# Patient Record
Sex: MALE | Race: WHITE | Employment: FULL TIME | URBAN - NONMETROPOLITAN AREA
[De-identification: names, ages, dates, MRNs, and addresses within clinical notes are randomized per-mention and may not be internally consistent; named-entity substitution may affect disease eponyms.]

---

## 2017-06-02 ENCOUNTER — OFFICE VISIT (OUTPATIENT)
Dept: OTOLARYNGOLOGY | Age: 47
End: 2017-06-02

## 2017-06-02 VITALS
HEART RATE: 68 BPM | HEIGHT: 70 IN | WEIGHT: 156.5 LBS | TEMPERATURE: 97.7 F | DIASTOLIC BLOOD PRESSURE: 70 MMHG | BODY MASS INDEX: 22.41 KG/M2 | RESPIRATION RATE: 16 BRPM | SYSTOLIC BLOOD PRESSURE: 118 MMHG

## 2017-06-02 DIAGNOSIS — J36 PERITONSILLAR ABSCESS: Primary | ICD-10-CM

## 2017-06-02 PROCEDURE — 99203 OFFICE O/P NEW LOW 30 MIN: CPT | Performed by: PHYSICIAN ASSISTANT

## 2017-06-02 ASSESSMENT — ENCOUNTER SYMPTOMS
SORE THROAT: 0
APNEA: 0
VOMITING: 0
EYE PAIN: 0
RHINORRHEA: 0
EYE ITCHING: 0
PHOTOPHOBIA: 0
CONSTIPATION: 0
ANAL BLEEDING: 0
NAUSEA: 0
SINUS PRESSURE: 0
COUGH: 0
BACK PAIN: 0
EYE REDNESS: 0
STRIDOR: 0
CHOKING: 0
FACIAL SWELLING: 1
BLOOD IN STOOL: 0
ABDOMINAL PAIN: 0
DIARRHEA: 0
TROUBLE SWALLOWING: 1
EYE DISCHARGE: 0
ABDOMINAL DISTENTION: 0
WHEEZING: 0
SHORTNESS OF BREATH: 0
COLOR CHANGE: 0
RECTAL PAIN: 0
CHEST TIGHTNESS: 0
VOICE CHANGE: 0

## 2017-06-19 ENCOUNTER — TELEPHONE (OUTPATIENT)
Dept: OTOLARYNGOLOGY | Age: 47
End: 2017-06-19

## 2017-06-20 ENCOUNTER — OFFICE VISIT (OUTPATIENT)
Dept: OTOLARYNGOLOGY | Age: 47
End: 2017-06-20

## 2017-06-20 VITALS
TEMPERATURE: 98.2 F | RESPIRATION RATE: 12 BRPM | WEIGHT: 160.1 LBS | HEART RATE: 54 BPM | BODY MASS INDEX: 22.92 KG/M2 | HEIGHT: 70 IN | DIASTOLIC BLOOD PRESSURE: 70 MMHG | SYSTOLIC BLOOD PRESSURE: 98 MMHG

## 2017-06-20 DIAGNOSIS — J36 PERITONSILLAR ABSCESS: Primary | ICD-10-CM

## 2017-06-20 PROCEDURE — 99213 OFFICE O/P EST LOW 20 MIN: CPT | Performed by: PHYSICIAN ASSISTANT

## 2017-06-20 ASSESSMENT — ENCOUNTER SYMPTOMS
ABDOMINAL DISTENTION: 0
CHOKING: 0
VOMITING: 0
WHEEZING: 0
EYE ITCHING: 0
BACK PAIN: 0
SINUS PRESSURE: 0
FACIAL SWELLING: 0
RECTAL PAIN: 0
STRIDOR: 0
EYE DISCHARGE: 0
RHINORRHEA: 0
ABDOMINAL PAIN: 0
PHOTOPHOBIA: 0
VOICE CHANGE: 0
SHORTNESS OF BREATH: 0
BLOOD IN STOOL: 0
COUGH: 1
CHEST TIGHTNESS: 0
DIARRHEA: 0
SORE THROAT: 1
EYE REDNESS: 0
NAUSEA: 0
CONSTIPATION: 0
ANAL BLEEDING: 0
COLOR CHANGE: 0
EYE PAIN: 0
APNEA: 0
TROUBLE SWALLOWING: 0

## 2018-04-05 ENCOUNTER — HOSPITAL ENCOUNTER (EMERGENCY)
Age: 48
Discharge: HOME | End: 2018-04-05
Payer: COMMERCIAL

## 2018-04-05 VITALS
DIASTOLIC BLOOD PRESSURE: 79 MMHG | HEART RATE: 72 BPM | BODY MASS INDEX: 23.39 KG/M2 | TEMPERATURE: 99.1 F | HEIGHT: 70 IN | OXYGEN SATURATION: 97 % | SYSTOLIC BLOOD PRESSURE: 122 MMHG | RESPIRATION RATE: 18 BRPM | WEIGHT: 163.4 LBS

## 2018-04-05 DIAGNOSIS — J02.9 ACUTE PHARYNGITIS, UNSPECIFIED ETIOLOGY: Primary | ICD-10-CM

## 2018-04-05 PROCEDURE — 99212 OFFICE O/P EST SF 10 MIN: CPT

## 2018-04-05 RX ORDER — CEPHALEXIN 500 MG/1
500 CAPSULE ORAL 2 TIMES DAILY
Qty: 20 CAPSULE | Refills: 0 | Status: SHIPPED | OUTPATIENT
Start: 2018-04-05 | End: 2018-04-15

## 2018-04-05 ASSESSMENT — PAIN SCALES - GENERAL: PAINLEVEL_OUTOF10: 4

## 2018-04-05 ASSESSMENT — ENCOUNTER SYMPTOMS
SORE THROAT: 1
TROUBLE SWALLOWING: 1
COUGH: 1

## 2018-04-05 ASSESSMENT — PAIN DESCRIPTION - LOCATION: LOCATION: GENERALIZED

## 2018-04-05 ASSESSMENT — PAIN DESCRIPTION - PAIN TYPE: TYPE: ACUTE PAIN

## 2018-04-05 ASSESSMENT — PAIN DESCRIPTION - DESCRIPTORS: DESCRIPTORS: ACHING

## 2019-02-04 ENCOUNTER — HOSPITAL ENCOUNTER (EMERGENCY)
Age: 49
Discharge: HOME | End: 2019-02-04
Payer: COMMERCIAL

## 2019-02-04 VITALS
OXYGEN SATURATION: 98 % | RESPIRATION RATE: 16 BRPM | BODY MASS INDEX: 22.62 KG/M2 | HEART RATE: 64 BPM | WEIGHT: 158 LBS | TEMPERATURE: 98.3 F | HEIGHT: 70 IN | DIASTOLIC BLOOD PRESSURE: 73 MMHG | SYSTOLIC BLOOD PRESSURE: 134 MMHG

## 2019-02-04 DIAGNOSIS — M79.674 GREAT TOE PAIN, RIGHT: Primary | ICD-10-CM

## 2019-02-04 PROCEDURE — 99213 OFFICE O/P EST LOW 20 MIN: CPT | Performed by: NURSE PRACTITIONER

## 2019-02-04 PROCEDURE — 99212 OFFICE O/P EST SF 10 MIN: CPT

## 2019-02-04 RX ORDER — PREDNISONE 20 MG/1
TABLET ORAL
Qty: 21 TABLET | Refills: 0 | Status: SHIPPED | OUTPATIENT
Start: 2019-02-04 | End: 2019-12-28

## 2019-02-04 RX ORDER — MELOXICAM 15 MG/1
15 TABLET ORAL DAILY
COMMUNITY
End: 2019-12-28 | Stop reason: ALTCHOICE

## 2019-02-04 ASSESSMENT — PAIN DESCRIPTION - DESCRIPTORS: DESCRIPTORS: SORE;SHOOTING

## 2019-02-04 ASSESSMENT — PAIN - FUNCTIONAL ASSESSMENT: PAIN_FUNCTIONAL_ASSESSMENT: PREVENTS OR INTERFERES SOME ACTIVE ACTIVITIES AND ADLS

## 2019-02-04 ASSESSMENT — PAIN DESCRIPTION - ONSET: ONSET: GRADUAL

## 2019-02-04 ASSESSMENT — PAIN DESCRIPTION - PAIN TYPE: TYPE: ACUTE PAIN

## 2019-02-04 ASSESSMENT — PAIN DESCRIPTION - LOCATION: LOCATION: FOOT

## 2019-02-04 ASSESSMENT — PAIN DESCRIPTION - PROGRESSION: CLINICAL_PROGRESSION: NOT CHANGED

## 2019-02-04 ASSESSMENT — ENCOUNTER SYMPTOMS
SHORTNESS OF BREATH: 0
COLOR CHANGE: 0
CHEST TIGHTNESS: 0

## 2019-02-04 ASSESSMENT — PAIN SCALES - GENERAL: PAINLEVEL_OUTOF10: 6

## 2019-02-04 ASSESSMENT — PAIN DESCRIPTION - ORIENTATION: ORIENTATION: RIGHT

## 2019-02-04 ASSESSMENT — PAIN DESCRIPTION - FREQUENCY: FREQUENCY: CONTINUOUS

## 2019-12-28 ENCOUNTER — HOSPITAL ENCOUNTER (EMERGENCY)
Age: 49
Discharge: HOME | End: 2019-12-28
Attending: EMERGENCY MEDICINE
Payer: COMMERCIAL

## 2019-12-28 VITALS
DIASTOLIC BLOOD PRESSURE: 83 MMHG | BODY MASS INDEX: 22.96 KG/M2 | SYSTOLIC BLOOD PRESSURE: 122 MMHG | OXYGEN SATURATION: 98 % | TEMPERATURE: 98.1 F | WEIGHT: 160 LBS | HEART RATE: 70 BPM | RESPIRATION RATE: 16 BRPM

## 2019-12-28 PROCEDURE — 99213 OFFICE O/P EST LOW 20 MIN: CPT

## 2019-12-28 PROCEDURE — 99213 OFFICE O/P EST LOW 20 MIN: CPT | Performed by: EMERGENCY MEDICINE

## 2019-12-28 RX ORDER — DOXYCYCLINE HYCLATE 100 MG
100 TABLET ORAL 2 TIMES DAILY
Qty: 14 TABLET | Refills: 0 | Status: SHIPPED | OUTPATIENT
Start: 2019-12-28 | End: 2020-01-04

## 2019-12-28 RX ORDER — IBUPROFEN 600 MG/1
600 TABLET ORAL EVERY 6 HOURS PRN
Qty: 20 TABLET | Refills: 0 | Status: SHIPPED | OUTPATIENT
Start: 2019-12-28 | End: 2020-01-17

## 2019-12-28 RX ORDER — PROMETHAZINE HYDROCHLORIDE AND CODEINE PHOSPHATE 6.25; 1 MG/5ML; MG/5ML
5 SYRUP ORAL 4 TIMES DAILY PRN
Qty: 120 ML | Refills: 0 | Status: SHIPPED | OUTPATIENT
Start: 2019-12-28 | End: 2020-01-01

## 2019-12-28 RX ORDER — IBUPROFEN 400 MG/1
400 TABLET ORAL EVERY 6 HOURS PRN
COMMUNITY
End: 2019-12-28 | Stop reason: ALTCHOICE

## 2019-12-28 ASSESSMENT — ENCOUNTER SYMPTOMS
RHINORRHEA: 1
EYE REDNESS: 0
WHEEZING: 0
NAUSEA: 0
EYE DISCHARGE: 0
DIARRHEA: 0
SINUS PRESSURE: 0
BACK PAIN: 0
ABDOMINAL PAIN: 0
TROUBLE SWALLOWING: 0
STRIDOR: 0
SHORTNESS OF BREATH: 0
EYE PAIN: 0
VOICE CHANGE: 0
SORE THROAT: 0
VOMITING: 0
COUGH: 1

## 2019-12-28 NOTE — ED PROVIDER NOTES
[id number]       Chief Complaint   Patient presents with    Cough     congested       Nurses Notes reviewed and I agree except as noted in the HPI. HISTORY OF PRESENT ILLNESS   Marjo Cranker is a 51 y.o. male who presents with 4-day history of cough, congestion, scratchy throat, fatigue, malaise, myalgias. Taking OTC meds with partial relief. Symptoms identical to previous bronchitis. No fever, vomiting, chest pain, shortness of breath, altered mental status, lethargy, rash,  symptoms. No history of asthma or diabetes. Smokes cigarettes. REVIEW OF SYSTEMS     Review of Systems   Constitutional: Positive for appetite change and fatigue. Negative for chills, fever and unexpected weight change. HENT: Positive for congestion, postnasal drip and rhinorrhea. Negative for ear discharge, ear pain, sinus pressure, sneezing, sore throat, trouble swallowing and voice change. Eyes: Negative for pain, discharge and redness. Respiratory: Positive for cough. Negative for shortness of breath, wheezing and stridor. Cardiovascular: Negative for chest pain and leg swelling. Gastrointestinal: Negative for abdominal pain, diarrhea, nausea and vomiting. Genitourinary: Negative for dysuria, frequency, hematuria and urgency. Musculoskeletal: Positive for myalgias. Negative for arthralgias, back pain and neck pain. Skin: Negative for rash. Neurological: Negative for dizziness, syncope, weakness and headaches. Hematological: Negative for adenopathy. Psychiatric/Behavioral: Negative for behavioral problems, confusion, sleep disturbance and suicidal ideas. The patient is not nervous/anxious. All other systems reviewed and are negative. PAST MEDICAL HISTORY         Diagnosis Date    Former smoker     Tonsil, abscess        SURGICAL HISTORY     Patient  has no past surgical history on file.     CURRENT MEDICATIONS       Discharge Medication List as of 12/28/2019  6:06 PM          ALLERGIES     Patient is has No Known Allergies. FAMILY HISTORY     Patient'sfamily history includes Alzheimer's Disease in his father and paternal grandfather; Cancer in his father; Heart Disease in his father and mother. SOCIAL HISTORY     Patient  reports that he has been smoking cigarettes. He has a 7.50 pack-year smoking history. He has never used smokeless tobacco. He reports current alcohol use of about 14.0 standard drinks of alcohol per week. He reports that he does not use drugs. PHYSICAL EXAM     ED TRIAGE VITALS  BP: 122/83, Temp: 98.1 °F (36.7 °C), Pulse: 70, Resp: 16, SpO2: 98 %  Physical Exam  Vitals signs and nursing note reviewed. Constitutional:       Appearance: He is well-developed. Comments: Dry cough, moist membranes, normal airway   HENT:      Head: Normocephalic and atraumatic. Right Ear: Tympanic membrane and external ear normal.      Left Ear: Tympanic membrane and external ear normal.      Nose: Nose normal.      Mouth/Throat:      Pharynx: No oropharyngeal exudate. Comments: Oropharynx normal  Eyes:      General: No scleral icterus. Right eye: No discharge. Left eye: No discharge. Extraocular Movements:      Right eye: Normal extraocular motion. Left eye: Normal extraocular motion. Conjunctiva/sclera: Conjunctivae normal.      Pupils: Pupils are equal, round, and reactive to light. Comments: Conjunctiva clear   Neck:      Musculoskeletal: Normal range of motion. Thyroid: No thyromegaly. Vascular: No JVD. Comments: No meningismus  Cardiovascular:      Rate and Rhythm: Normal rate and regular rhythm. Pulses: Normal pulses. Heart sounds: Normal heart sounds, S1 normal and S2 normal. No murmur. No friction rub. No gallop. Pulmonary:      Effort: Pulmonary effort is normal. No tachypnea or respiratory distress. Breath sounds: Normal breath sounds.  No stridor. No decreased breath sounds, wheezing or rales. Comments: Dry cough, diffuse rhonchi, breath sounds equal, no wheezing  Chest:      Chest wall: No tenderness. Abdominal:      General: Bowel sounds are normal. There is no distension. Palpations: Abdomen is soft. There is no mass. Tenderness: There is no tenderness. There is no guarding or rebound. Comments: Soft nontender   Musculoskeletal: Normal range of motion. General: No tenderness. Right lower leg: Normal.      Left lower leg: Normal.   Lymphadenopathy:      Cervical: Cervical adenopathy present. Right cervical: Superficial cervical adenopathy present. No deep or posterior cervical adenopathy. Left cervical: Superficial cervical adenopathy present. No deep or posterior cervical adenopathy. Skin:     General: Skin is warm and dry. Findings: No erythema or rash. Comments: No Rash or bruising   Neurological:      Mental Status: He is alert and oriented to person, place, and time. Cranial Nerves: No cranial nerve deficit. Motor: No abnormal muscle tone. Coordination: Coordination normal.      Deep Tendon Reflexes: Reflexes are normal and symmetric. Reflexes normal.      Comments: Appropriate, no focal findings   Psychiatric:         Behavior: Behavior normal.         Thought Content: Thought content normal.         Judgment: Judgment normal.         DIAGNOSTIC RESULTS   Labs: No results found for this visit on 12/28/19. IMAGING:  No orders to display     URGENT CARE COURSE:     Vitals:    12/28/19 1720   BP: 122/83   Pulse: 70   Resp: 16   Temp: 98.1 °F (36.7 °C)   TempSrc: Temporal   SpO2: 98%   Weight: 160 lb (72.6 kg)       Medications - No data to display  PROCEDURES:  None  FINALIMPRESSION      1. Acute bronchitis due to infection    2. Tobacco use disorder        DISPOSITION/PLAN   DISPOSITION Decision To Discharge 12/28/2019 06:02:03 PM  Nontoxic, well-hydrated, normal airway. No airway abscess or epiglottitis, sepsis, CNS infection, pneumonia, hypoxia, bronchospasm. No ACS, CHF, PE. Patient has acute bronchitis. Will treat with doxycycline, Phenergan with codeine, Motrin, Tylenol, increased oral clear liquids, vaporizer, rest.  Patient to recheck with PCP in 5 days if problems persist, and he understands to go to ED if worse. PATIENT REFERRED TO:  Nia Barragan DO  [address] [place] [address] Road  [phone]    Schedule an appointment as soon as possible for a visit   Recheck if problems persist, go to emergency if worse    DISCHARGE MEDICATIONS:  Discharge Medication List as of 12/28/2019  6:06 PM      START taking these medications    Details   doxycycline hyclate (VIBRA-TABS) 100 MG tablet Take 1 tablet by mouth 2 times daily for 7 days, Disp-14 tablet, R-0Print      promethazine-codeine (PHENERGAN WITH CODEINE) 6.25-10 MG/5ML syrup Take 5 mLs by mouth 4 times daily as needed for Cough for up to 4 days.  Caution will cause drowsiness, Disp-120 mL, R-0Print      ibuprofen (IBU) 600 MG tablet Take 1 tablet by mouth every 6 hours as needed for Pain or Fever (Take with food 3 times daily for pain or fever), Disp-20 tablet, R-0Print           Discharge Medication List as of 12/28/2019  6:06 PM          MD Vandana Ogden MD  12/28/19 0710

## 2023-09-20 ENCOUNTER — HOSPITAL ENCOUNTER (OUTPATIENT)
Dept: PHYSICAL THERAPY | Age: 53
Setting detail: THERAPIES SERIES
Discharge: HOME | End: 2023-09-20
Payer: COMMERCIAL

## 2023-09-20 PROCEDURE — 97162 PT EVAL MOD COMPLEX 30 MIN: CPT

## 2023-09-20 NOTE — DISCHARGE SUMMARY
** PLEASE SIGN, DATE AND TIME CERTIFICATION BELOW AND RETURN TO [facility] OUTPATIENT REHABILITATION (FAX #: [phone]). ATTEST/CO-SIGN IF ACCESSING VIA [employer]. THANK YOU.**    I certify that I have examined the patient below and determined that Physical Medicine and Rehabilitation service is necessary and that I approve the established plan of care for up to 90 days or as specifically noted. Attestation, signature or co-signature of physician indicates approval of certification requirements.    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 History of Present Illness: Polymyalgia rheumatica diagnosed 7 months ago-- still taking steroid  5mg per day . History of low back pain and early nerve burns.   Most recent [address] with good pain releif  AM is the worse, sitting is real uncomfortable      SUBJECTIVE: patient

## 2024-12-19 ENCOUNTER — OFFICE VISIT (OUTPATIENT)
Age: 54
End: 2024-12-19
Payer: COMMERCIAL

## 2024-12-19 VITALS
DIASTOLIC BLOOD PRESSURE: 72 MMHG | OXYGEN SATURATION: 98 % | SYSTOLIC BLOOD PRESSURE: 114 MMHG | WEIGHT: 168.6 LBS | BODY MASS INDEX: 24.14 KG/M2 | HEIGHT: 70 IN | HEART RATE: 78 BPM

## 2024-12-19 DIAGNOSIS — M25.531 PAIN AND SWELLING OF RIGHT WRIST: ICD-10-CM

## 2024-12-19 DIAGNOSIS — M35.3 PMR (POLYMYALGIA RHEUMATICA) (HCC): Primary | ICD-10-CM

## 2024-12-19 DIAGNOSIS — M25.431 PAIN AND SWELLING OF RIGHT WRIST: ICD-10-CM

## 2024-12-19 LAB
A/G RATIO: 1.4 (ref 1.5–2.5)
ALBUMIN: 4.2 G/DL (ref 3.5–5)
ALP BLD-CCNC: 76 IU/L (ref 41–137)
ALT SERPL-CCNC: 18 IU/L (ref 10–40)
ANION GAP SERPL CALCULATED.3IONS-SCNC: 6 MMOL/L (ref 4–12)
AST SERPL-CCNC: 27 IU/L (ref 15–41)
BASOPHILS ABSOLUTE: 100 /CMM (ref 0–200)
BASOPHILS RELATIVE PERCENT: 1.3 % (ref 0–2)
BILIRUB SERPL-MCNC: 0.6 MG/DL (ref 0.2–1)
BUN BLDV-MCNC: 10 MG/DL (ref 7–20)
C-REACTIVE PROTEIN: < 0.5 MG/DL
CALCIUM SERPL-MCNC: 9 MG/DL (ref 8.8–10.5)
CHLORIDE BLD-SCNC: 102 MEQ/L (ref 101–111)
CO2: 29 MEQ/L (ref 21–32)
CREAT SERPL-MCNC: 0.97 MG/DL (ref 0.6–1.3)
CREATININE CLEARANCE: >60
EOSINOPHILS ABSOLUTE: 0 /CMM (ref 0–500)
EOSINOPHILS RELATIVE PERCENT: 0.4 % (ref 0–6)
GLUCOSE: 77 MG/DL (ref 70–110)
HCT VFR BLD CALC: 48.1 % (ref 40–49)
HEMOGLOBIN: 16.7 GM/DL (ref 13.5–16.5)
LYMPHOCYTES ABSOLUTE: 2300 /CMM (ref 1000–4800)
LYMPHOCYTES RELATIVE PERCENT: 25.1 % (ref 15–45)
MCH RBC QN AUTO: 33.8 PG (ref 27.5–33)
MCHC RBC AUTO-ENTMCNC: 34.8 GM/DL (ref 33–36)
MCV RBC AUTO: 97.3 CU MIC (ref 80–97)
MONOCYTES ABSOLUTE: 700 /CMM (ref 0–800)
MONOCYTES RELATIVE PERCENT: 7.7 % (ref 2–10)
NEUTROPHILS ABSOLUTE: 6000 /CMM (ref 1800–7700)
NEUTROPHILS RELATIVE PERCENT: 65.5 % (ref 40–70)
NUCLEATED RBCS: 0.1 /100 WBC
PDW BLD-RTO: 12.8 % (ref 12–16)
PLATELET # BLD: 292 TH/CMM (ref 150–400)
POTASSIUM SERPL-SCNC: 4.2 MEQ/L (ref 3.6–5)
RBC # BLD: 4.95 MIL/CMM (ref 4.5–6)
SED RATE, AUTOMATED: 5 MM/HR
SODIUM BLD-SCNC: 137 MEQ/L (ref 135–145)
TOTAL CK: 113 IU/L (ref 49–397)
TOTAL PROTEIN: 7.2 G/DL (ref 6.2–8)
URIC ACID: 5.7 MG/DL (ref 4.8–8.7)
WBC # BLD: 9.1 TH/CMM (ref 4.4–10.5)

## 2024-12-19 PROCEDURE — 99204 OFFICE O/P NEW MOD 45 MIN: CPT | Performed by: INTERNAL MEDICINE

## 2024-12-19 RX ORDER — PREDNISONE 5 MG/1
TABLET ORAL
COMMUNITY
Start: 2024-09-15

## 2024-12-19 ASSESSMENT — ENCOUNTER SYMPTOMS
VOMITING: 0
WHEEZING: 1
COUGH: 0
SHORTNESS OF BREATH: 0
ABDOMINAL PAIN: 0
BLOOD IN STOOL: 0
NAUSEA: 0

## 2024-12-19 NOTE — PROGRESS NOTES
[facility]   Rheumatology Clinic Note      12/19/2024        Reason for Consult:  PMR  Requesting Physician:  Pablo Barragan DO     CHIEF COMPLAINT:    Chief Complaint   Patient presents with    New Patient     Joint pain and inflammation    Joint Pain     Pt has had low back pain quite sometime. He has noticed pain in upper shoulders; trouble with ROM. Pt Rx prednisone which has helped. Now experiencing bilateral wrist and finger joint pain.  Pain level 1 - worsened with sitting long periods and first thing in the mornings. Rt worsened area           HISTORY OF PRESENT ILLNESS:    54 y.o. male presents today for evaluation of PMR.     Was diagnosed with PMR January 2023. Has been on slow decrease prednisone. Is presently on 5 mg daily.    Has low back pain for 25 plus years.  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    Episodes of upper back and bilateral shoulder blade pain associated with muscle spasms. Severe pain that he holds his breath for few seconds.  Episodes of muscle spasm in his mid-abdomen.    2 years ago, he noted more pain in upper back area. Nerve ablation did not help.   Progressively worsen. Pain started involving his shoulders and hips. Was having difficulty raising his arms due to pain and stiffness.     Recently, he has been having pain in his wrists. Driving causes numbness in the right hand. Episodes of swelling in the right wrist. Wearing a wrist brace helps.     No skin rash.  No psoriasis.  No IBD-like symptoms.      Past Medical History:     has a past medical history of Former smoker and Tonsil, abscess.    Past Surgical History:     has no past surgical history on file.    Current Medications:      Current Outpatient Medications:     predniSONE (DELTASONE) 5 MG tablet, TAKE 2 TABLETS EVERY OTHER DAY ALTERNATING WITH 1 TABLET EVERY OTHER DAY, Disp: , Rfl:     Allergies:    Patient has no known

## 2024-12-20 ENCOUNTER — TELEPHONE (OUTPATIENT)
Dept: ADMINISTRATIVE | Age: 54
End: 2024-12-20

## 2024-12-20 ENCOUNTER — HOSPITAL ENCOUNTER (OUTPATIENT)
Dept: GENERAL RADIOLOGY | Age: 54
Discharge: HOME | End: 2024-12-20

## 2024-12-20 DIAGNOSIS — Z00.6 EXAMINATION FOR NORMAL COMPARISON FOR CLINICAL RESEARCH: ICD-10-CM

## 2024-12-20 LAB
CYCLIC CITRULLINATED PEPTIDE ANTIBODY IGG: < 0.4 U/ML (ref 0–6.9)
RHEUMATOID FACTOR: NEGATIVE

## 2024-12-20 NOTE — TELEPHONE ENCOUNTER
Pt called and states Dr. Kate is sending over his labs results from about a year ago and he wants this office to review them.  Please advise pt at [phone]

## 2024-12-23 ENCOUNTER — HOSPITAL ENCOUNTER (OUTPATIENT)
Dept: GENERAL RADIOLOGY | Age: 54
Discharge: HOME | End: 2024-12-23

## 2024-12-23 DIAGNOSIS — Z00.6 EXAMINATION FOR NORMAL COMPARISON FOR CLINICAL RESEARCH: ICD-10-CM

## 2024-12-30 ENCOUNTER — TELEPHONE (OUTPATIENT)
Age: 54
End: 2024-12-30

## 2024-12-30 RX ORDER — PREDNISONE 1 MG/1
5 TABLET ORAL DAILY
Qty: 150 TABLET | Refills: 1 | Status: SHIPPED | OUTPATIENT
Start: 2024-12-30 | End: 2025-02-28

## 2024-12-30 NOTE — TELEPHONE ENCOUNTER
Please inform pt that his blood tests and XR all looked good. Markers of inflammation were in normal range. Testing for gout and rheumatoid arthritis were negative.    I'd like to decrease his prednsione from 5 mg daily to 4 mg daily.  I'm sending prednsione 1 mg tab, take 4 tab once daily to his pharmacy.

## 2025-01-20 ENCOUNTER — OFFICE VISIT (OUTPATIENT)
Age: 55
End: 2025-01-20
Payer: COMMERCIAL

## 2025-01-20 VITALS
WEIGHT: 168.65 LBS | HEIGHT: 70 IN | DIASTOLIC BLOOD PRESSURE: 78 MMHG | HEART RATE: 67 BPM | BODY MASS INDEX: 24.14 KG/M2 | OXYGEN SATURATION: 97 % | SYSTOLIC BLOOD PRESSURE: 120 MMHG

## 2025-01-20 DIAGNOSIS — M25.531 PAIN AND SWELLING OF RIGHT WRIST: Primary | ICD-10-CM

## 2025-01-20 DIAGNOSIS — M25.431 PAIN AND SWELLING OF RIGHT WRIST: Primary | ICD-10-CM

## 2025-01-20 PROCEDURE — 99214 OFFICE O/P EST MOD 30 MIN: CPT | Performed by: INTERNAL MEDICINE

## 2025-01-20 ASSESSMENT — ENCOUNTER SYMPTOMS
ABDOMINAL PAIN: 0
NAUSEA: 0
VOMITING: 0
COUGH: 0
SHORTNESS OF BREATH: 0

## 2025-01-20 NOTE — PROGRESS NOTES
[facility]   Rheumatology Clinic Note      1/20/2025          CHIEF COMPLAINT:    Chief Complaint   Patient presents with    Follow-up     4 week follow up PMR(polymyalgia rheumatica) (HCC)    OTHER     Patient states that he is doing similar to his prior visit.   Pain level 0         HISTORY OF PRESENT ILLNESS:    54 y.o. male with presumed diagnosis of PMR presents for follow up. When seen last, labs were ordered for further evaluation. He was then instructed to decrease prednisone from 5 mg to 4 mg daily.    Reports having generalized ache that is most pronounced in upper and back back, hips, left shoulder.  Also has pain in his abdomen.  Continues to have swelling and discomfort in right wrist, unchanged.   No swelling in other joints.      Initial Consultation  12/19/20224:  Was diagnosed with PMR January 2023. Has been on slow decrease prednisone. Is presently on 5 mg daily.    Has low back pain for 25 plus years.  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    Episodes of upper back and bilateral shoulder blade pain associated with muscle spasms. Severe pain that he holds his breath for few seconds.  Episodes of muscle spasm in his mid-abdomen.    2 years ago, he noted more pain in upper back area. Nerve ablation did not help.   Progressively worsen. Pain started involving his shoulders and hips. Was having difficulty raising his arms due to pain and stiffness.     Recently, he has been having pain in his wrists. Driving causes numbness in the right hand. Episodes of swelling in the right wrist. Wearing a wrist brace helps.     No skin rash.  No psoriasis.  No IBD-like symptoms.      Past Medical History:     has a past medical history of Former smoker and Tonsil, abscess.    Past Surgical History:     has no past surgical history on file.    Current Medications:      Current Outpatient Medications:     predniSONE (DELTASONE) 1 MG tablet,

## 2025-02-06 NOTE — TELEPHONE ENCOUNTER
COLONOSCOPY PREP - SUPREP    WHAT IS A COLONOSCOPY?  A colonoscopy is a test to view the inside of the lower digestive tract. Colonoscopy can help find precancerous polyps and colorectal cancer at an early stage, when treatment is most effective. Research shows that colonoscopies may reduce new cases of colorectal cancer by up to 69%.    You will need to follow instructions for a successful prep  10 DAYS  BEFORE PROCEDURE  bowel prep kit a from pharmacist  Purchase one 1 oz bottle of Simethicone drops (infant gas relief drops), available over the counter, at your preferred pharmacy   7 DAYS  BEFORE PROCEDURE  If applicable, discontinue use of GLP-1 medications (i.e. Ozempic, Wegovy, Trulicity, Victoza, Mounjaro)    5 DAYS  BEFORE PROCEDURE Stop taking iron supplements   Avoid anti-inflammatory drugs unless necessary (Advil, Motrin, Ibuprofen, etc.)    3 DAYS  BEFORE PROCEDURE Start following Low Fiber Diet until 1 day prior to procedure (diet included on next page)   Do not eat nuts, seeds, corn, popcorn, and fiber supplements until after your procedure   Hold SGLT-2 meds 72 hours   Canagliflozin: (Invokana)  Dapagliflozin: (Farxiga)  Dapagliflozin/Saxagliptin: (Qtem)  Empagliflozin: (Jardiance)  Empagliflozin/Linagliptin: (Glyxambi)  Ertugliflozin: (Steglatro)  Ertugliflozin/Sitagliptin: (steglujan)   2 DAYS  BEFORE PROCEDURE Confirm driving arrangements; please plan to have a responsible adult accompany you to the procedure and for 12-24 hours after the procedure. We cannot release patient post-procedure back home via Uber/taxi   No solid foods after midnight      1 DAY  BEFORE PROCEDURE Start following Clear Liquid Diet (diet included on next page)  Drink 8-10 glasses of clear liquids to avoid dehydration   At 5 PM: Pour (6oz) bottle of SUPREP liquid into provided container  Add cold water to fill line. Add 3/4 teaspoon of Simethicone drops, mix and drink  Drink 32 oz of clear liquids over the next hour  Patient notified and verbalized understanding.          DAY OF  THE PROCEDURE 6 hours before appointment: Pour (6oz) bottle of SUPREP liquid into provided container  Add cold water to fill line. Add 3/4 teaspoon Simethicone drops, mix, and drink   Continue drinking 32 oz of clear liquids over next hour  Continue following Clear Liquid Diet until 4 hours prior to arrival time  4 hours before appointment:   No more liquids or food   Can brush teeth, but do not swallow          **Prepping times and instructions can be altered depending on the time of your procedure.     **Please call our office and ask to speak to a nurse to discuss, or call your procedural site'sphone number.    LOW FIBER DIET  Foods Allowed Not Allowed     Drinks Coffee, tea, hot chocolate, soda, fruit & vegetable juice with no pulp; less than 2 cups milk/day No alcohol, no pulp in fruit or vegetable juice, no red or purple color drinks     Breads & Cereal White bread, bagels; cooked cereals including  corn flakes, crisp/puffed rice, Cream of Wheat No whole wheat/whole grain bread, rolls, crackers or cereal, no breads/cereal with bran, oats, seeds, nuts, raisins or dates, no oatmeal   Milk Products Cheese, plain yogurt, sour cream, buttermilk, cream No milk products with granola or berries (or other fruit with skin)   Meats & Eggs Chicken, turkey, fish, seafood, tofu, eggs No processed or fried meats     Fats Butter, cream, katz, avocado, cooking oils & shortening, creamy nut butter No coconut, spicy salad dressings, chunky nut butters     Fruits Ripe, peeled apples, bananas, melon, pears & peaches, cooked/ canned fruits without skin/peel/ membranes No raw fruit with seeds, skin, or membranes: berries, pineapple, apples, oranges, grapefruit, watermelon, kiwi, pomegranate, dragon fruit   Vegetables Well cooked only: asparagus, carrots, mushrooms, pumpkin No raw allowed; no red or sainz beets, raw spinach, lettuces, cucumber, peas, beans   Potatoes & Starches Sweet, white & yellow potatoes without  skin, noodles, white rice No purple potatoes, fried potatoes, potato skins, whole wheat pasta, brown & wild rice   Beans None allowed None allowed   Nuts & Seeds None allowed None allowed     Soups Cream soups made with allowed milk (less than 2 cups/day) & allowed vegetables, broth soups Soups with more than 2 cups of milk per serving per day   Other Cooking spices not mentioned to the right (in moderation) No garlic, ami, olives, pickles, popcorn, horseradish, cayenne, chili powder       CLEAR LIQUID DIET  Clear liquids only, no solid foods     No chewing tobacco, alcohol and cannabis(24 hours prior to procedure)  Make sure to drink 12 cups of clear liquid, aiming for the right calorie intake            Allowed:   Ice pop/popsicles - no red or purple  Clear broth (all flavors)  Coffee & tea - no milk or creamer; sugar/sweetener OK  Gummy bears - No red or purple  Fruit juice (no pulp) - No red or purple  Sports drinks - no red or purple  Water/Ice - No red or purple  Soda & carbonated beverages - No red or purple  Gelatin/Jell-O® - No red or purple      If you are diabetic, aim for 45 grams of carbs per meal and 15-30 grams per snack.    Clear Liquids with about 15 grams of carbohydrates - 4oz apple juice, 8oz sports drink, 1/2 cup gelatin, 2 popsicles/ice pops    Clear liquids with no carbohydrates - Black coffee, tea (unsweetened or diet), clear diet soda, seltzer, flavored water, fat-free broth, bouillon or consommé

## 2025-03-03 RX ORDER — PREDNISONE 1 MG/1
5 TABLET ORAL DAILY
Qty: 150 TABLET | Refills: 1 | Status: SHIPPED | OUTPATIENT
Start: 2025-03-03 | End: 2025-05-02

## 2025-03-20 ENCOUNTER — OFFICE VISIT (OUTPATIENT)
Age: 55
End: 2025-03-20
Payer: COMMERCIAL

## 2025-03-20 VITALS
WEIGHT: 168.65 LBS | HEART RATE: 85 BPM | BODY MASS INDEX: 24.14 KG/M2 | DIASTOLIC BLOOD PRESSURE: 82 MMHG | OXYGEN SATURATION: 98 % | HEIGHT: 70 IN | SYSTOLIC BLOOD PRESSURE: 114 MMHG

## 2025-03-20 DIAGNOSIS — M35.3 PMR (POLYMYALGIA RHEUMATICA): Primary | ICD-10-CM

## 2025-03-20 DIAGNOSIS — M54.50 CHRONIC BILATERAL LOW BACK PAIN WITHOUT SCIATICA: ICD-10-CM

## 2025-03-20 DIAGNOSIS — G89.29 CHRONIC BILATERAL LOW BACK PAIN WITHOUT SCIATICA: ICD-10-CM

## 2025-03-20 PROCEDURE — 99214 OFFICE O/P EST MOD 30 MIN: CPT | Performed by: INTERNAL MEDICINE

## 2025-03-20 RX ORDER — SILDENAFIL 100 MG/1
TABLET, FILM COATED ORAL
COMMUNITY
Start: 2025-02-02

## 2025-03-20 ASSESSMENT — ENCOUNTER SYMPTOMS
ABDOMINAL PAIN: 0
SHORTNESS OF BREATH: 0
COUGH: 0
NAUSEA: 0
VOMITING: 0

## 2025-03-20 NOTE — PROGRESS NOTES
Ab 0 - 19 Units 3   Rheumatoid Factor 0 - 13 IU/mL < 10           IMPRESSION/RECOMMENDATIONS:      1. Initial diagnosis of PMR. Has been on prednisone for almost 2 years. Acute phase reactants within normal.  -- I believe the PMR is in remission, or possibly a misdiagnosis. No worsening symptoms as prednsione is being tapered down. Instructed Doughlas to stop prednsione.  -- reassess acute phase reactants in 2 weeks    2. Low back pain that improves with movement and worsens with rest. Concern for inflammatory back pain  -- assess HLA-B27, XR SI joints and lumbar spine    Call pt with test results      Orders Placed This Encounter   Procedures    XR SACROILIAC JOINTS (MIN 3 VIEWS)     Standing Status:   Future     Expected Date:   3/20/2025     Expiration Date:   3/20/2026    XR LUMBAR SPINE (2-3 VIEWS)     Standing Status:   Future     Expected Date:   3/20/2025     Expiration Date:   3/20/2026    Sedimentation Rate     Standing Status:   Future     Expected Date:   4/3/2025     Expiration Date:   9/20/2025    C-Reactive Protein     Standing Status:   Future     Expected Date:   4/3/2025     Expiration Date:   3/20/2026    HLA-B27 Antigen     Standing Status:   Future     Expected Date:   4/3/2025     Expiration Date:   3/20/2026    CBC with Auto Differential     Standing Status:   Future     Expected Date:   3/20/2025     Expiration Date:   9/20/2025    Comprehensive Metabolic Panel     Standing Status:   Future     Expected Date:   3/20/2025     Expiration Date:   3/20/2026        No orders of the defined types were placed in this encounter.       Juliet Caballero MD    [facility] RHEUMATOLOG[facility]  [address] [id number] [phone]

## 2025-04-01 LAB
A/G RATIO: 1.3 (ref 1.5–2.5)
ALBUMIN: 3.6 G/DL (ref 3.5–5)
ALP BLD-CCNC: 89 IU/L (ref 41–137)
ALT SERPL-CCNC: 14 IU/L (ref 10–40)
ANION GAP SERPL CALCULATED.3IONS-SCNC: 11 MMOL/L (ref 4–12)
AST SERPL-CCNC: 24 IU/L (ref 15–41)
BASOPHILS ABSOLUTE: 100 /CMM (ref 0–200)
BASOPHILS RELATIVE PERCENT: 1.3 % (ref 0–2)
BILIRUB SERPL-MCNC: 0.5 MG/DL (ref 0.2–1)
BUN BLDV-MCNC: 8 MG/DL (ref 7–20)
C-REACTIVE PROTEIN: 1.8 MG/DL
CALCIUM SERPL-MCNC: 8 MG/DL (ref 8.8–10.5)
CHLORIDE BLD-SCNC: 103 MEQ/L (ref 101–111)
CO2: 29 MEQ/L (ref 21–32)
CREAT SERPL-MCNC: 0.68 MG/DL (ref 0.6–1.3)
CREATININE CLEARANCE: >60
EOSINOPHILS ABSOLUTE: 100 /CMM (ref 0–500)
EOSINOPHILS RELATIVE PERCENT: 1.8 % (ref 0–6)
GLUCOSE: 91 MG/DL (ref 70–110)
HCT VFR BLD CALC: 42 % (ref 40–49)
HEMOGLOBIN: 14.4 GM/DL (ref 13.5–16.5)
LYMPHOCYTES ABSOLUTE: 2000 /CMM (ref 1000–4800)
LYMPHOCYTES RELATIVE PERCENT: 24.2 % (ref 15–45)
MCH RBC QN AUTO: 33.1 PG (ref 27.5–33)
MCHC RBC AUTO-ENTMCNC: 34.3 GM/DL (ref 33–36)
MCV RBC AUTO: 96.3 CU MIC (ref 80–97)
MONOCYTES ABSOLUTE: 700 /CMM (ref 0–800)
MONOCYTES RELATIVE PERCENT: 9.1 % (ref 2–10)
NEUTROPHILS ABSOLUTE: 5100 /CMM (ref 1800–7700)
NEUTROPHILS RELATIVE PERCENT: 63.6 % (ref 40–70)
NUCLEATED RBCS: 0.2 /100 WBC
PDW BLD-RTO: 13 % (ref 12–16)
PLATELET # BLD: 275 TH/CMM (ref 150–400)
POTASSIUM SERPL-SCNC: 4.1 MEQ/L (ref 3.6–5)
RBC # BLD: 4.36 MIL/CMM (ref 4.5–6)
SED RATE, AUTOMATED: 9 MM/HR
SODIUM BLD-SCNC: 143 MEQ/L (ref 135–145)
TOTAL PROTEIN: 6.3 G/DL (ref 6.2–8)
WBC # BLD: 8.1 TH/CMM (ref 4.4–10.5)

## 2025-04-02 ENCOUNTER — RESULTS FOLLOW-UP (OUTPATIENT)
Age: 55
End: 2025-04-02

## 2025-04-02 DIAGNOSIS — M46.1 BILATERAL SACROILIITIS: Primary | ICD-10-CM

## 2025-04-02 RX ORDER — MELOXICAM 15 MG/1
15 TABLET ORAL DAILY PRN
Qty: 30 TABLET | Refills: 5 | Status: SHIPPED | OUTPATIENT
Start: 2025-04-02

## 2025-04-02 NOTE — TELEPHONE ENCOUNTER
Sonny had XR SI joints and lumbar spine done at [facility]. Can we request images please.     Please inform him that XR of the SI joints is suggesting a condition called ankylosing spondylitis. This diagnosis fits his clinical presentation.    I'd like for him to take an anti-inflammatory called meloxicam 15 mg daily. He needs to take this with food, not on an empty stomach. He should not take ibuprofen or naproxen while being on this medication.    I'd like to see him back in 3 weeks to assess the effectiveness of this medication or switch to another treatment plan.

## 2025-04-04 LAB — HLA B27: NORMAL

## 2025-04-09 ENCOUNTER — HOSPITAL ENCOUNTER (OUTPATIENT)
Dept: GENERAL RADIOLOGY | Age: 55
Discharge: HOME | End: 2025-04-09

## 2025-04-09 DIAGNOSIS — Z00.6 EXAMINATION FOR NORMAL COMPARISON FOR CLINICAL RESEARCH: ICD-10-CM

## 2025-04-09 NOTE — TELEPHONE ENCOUNTER
Patient notified and verbalized understanding. Images requested. Images should be matched up tonight.         Patient asking about his lab results completed on 4/1/25. Thank you.

## 2025-04-10 NOTE — TELEPHONE ENCOUNTER
Patient notified. Patient stated he is taking the meloxicam for about 10 days now. Patient feels the meloxicam is helping, but not as good as the Aleve. Patient would take 2 aleve in the morning and one more in the evening before the meloxicam. Patient felt the aleve worked better. Please advise. Thank you.

## 2025-04-29 ENCOUNTER — OFFICE VISIT (OUTPATIENT)
Age: 55
End: 2025-04-29
Payer: COMMERCIAL

## 2025-04-29 ENCOUNTER — LAB (OUTPATIENT)
Dept: LAB | Age: 55
End: 2025-04-29

## 2025-04-29 VITALS
OXYGEN SATURATION: 96 % | WEIGHT: 167.4 LBS | DIASTOLIC BLOOD PRESSURE: 68 MMHG | SYSTOLIC BLOOD PRESSURE: 112 MMHG | HEART RATE: 74 BPM | BODY MASS INDEX: 23.96 KG/M2 | HEIGHT: 70 IN

## 2025-04-29 DIAGNOSIS — Z11.1 SCREENING-PULMONARY TB: ICD-10-CM

## 2025-04-29 DIAGNOSIS — M45.7 ANKYLOSING SPONDYLITIS OF LUMBOSACRAL REGION (HCC): ICD-10-CM

## 2025-04-29 DIAGNOSIS — Z11.59 NEED FOR HEPATITIS B SCREENING TEST: ICD-10-CM

## 2025-04-29 DIAGNOSIS — M45.7 ANKYLOSING SPONDYLITIS OF LUMBOSACRAL REGION (HCC): Primary | ICD-10-CM

## 2025-04-29 LAB
HAV IGM SER QL: NONREACTIVE
HBV CORE IGM SERPL QL IA: NONREACTIVE
HBV SURFACE AG SERPL QL IA: NONREACTIVE
HCV IGG SERPL QL IA: NONREACTIVE

## 2025-04-29 PROCEDURE — 99214 OFFICE O/P EST MOD 30 MIN: CPT | Performed by: INTERNAL MEDICINE

## 2025-04-29 RX ORDER — CELECOXIB 200 MG/1
200 CAPSULE ORAL 2 TIMES DAILY
Qty: 60 CAPSULE | Refills: 1 | Status: SHIPPED | OUTPATIENT
Start: 2025-04-29 | End: 2025-06-28

## 2025-04-29 ASSESSMENT — ENCOUNTER SYMPTOMS
SHORTNESS OF BREATH: 0
VOMITING: 0
NAUSEA: 0
COUGH: 0
ABDOMINAL PAIN: 0

## 2025-04-29 NOTE — PROGRESS NOTES
[facility]   Rheumatology Clinic Note      4/29/2025          CHIEF COMPLAINT:    Chief Complaint   Patient presents with    Follow-up     5 week OV PMR (polymyalgia rheumatica    Other     Muscle pain in back and neck. Resting causes increase pain  Bilateral hands and wrist have improved.          HISTORY OF PRESENT ILLNESS:    54 y.o. male with presumed diagnosis of PMR presents for follow up. When seen last, prednisone was tapered off. Imaging were ordered for evaluation of back pain.  Due to concerns of possible ankylosing spondylitis (fusion of SI joints seen on XR), meloxicam 15 mg daily was prescribed on 4/2/2025.    Thuan reports worsening pain when starting meloxicam, so he stopped taking it. Has been taking Aleve 440 mg twice a day that provides partial relief.   Continues to have pain in lower back, but recently has been experiencing pain in neck and thorax area. Pain in general improves with movement. Is worst early morning, sometimes wakes him up from sleep.       Initial Consultation  12/19/20224:  Was diagnosed with PMR January 2023. Has been on slow decrease prednisone. Is presently on 5 mg daily.    Has low back pain for 25 plus years.  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    Episodes of upper back and bilateral shoulder blade pain associated with muscle spasms. Severe pain that he holds his breath for few seconds.  Episodes of muscle spasm in his mid-abdomen.    2 years ago, he noted more pain in upper back area. Nerve ablation did not help.   Progressively worsen. Pain started involving his shoulders and hips. Was having difficulty raising his arms due to pain and stiffness.     Recently, he has been having pain in his wrists. Driving causes numbness in the right hand. Episodes of swelling in the right wrist. Wearing a wrist brace helps.     No skin rash.  No psoriasis.  No IBD-like symptoms.      Past Medical History:

## 2025-05-01 LAB
GAMMA INTERFERON BACKGROUND BLD IA-ACNC: 0.01 IU/ML
M TB IFN-G BLD-IMP: NEGATIVE
M TB IFN-G CD4+ BCKGRND COR BLD-ACNC: 0.06 IU/ML
M TB IFN-G CD4+CD8+ BCKGRND COR BLD-ACNC: 0.06 IU/ML
MITOGEN IGNF BCKGRD COR BLD-ACNC: 9.99 IU/ML

## 2025-05-13 ENCOUNTER — TELEPHONE (OUTPATIENT)
Age: 55
End: 2025-05-13

## 2025-05-13 DIAGNOSIS — M45.7 ANKYLOSING SPONDYLITIS OF LUMBOSACRAL REGION (HCC): Primary | ICD-10-CM

## 2025-05-13 RX ORDER — IXEKIZUMAB 80 MG/ML
160 INJECTION, SOLUTION SUBCUTANEOUS ONCE
Qty: 2 ML | Refills: 0 | Status: SHIPPED | OUTPATIENT
Start: 2025-05-13 | End: 2025-05-14

## 2025-05-13 RX ORDER — IXEKIZUMAB 80 MG/ML
80 INJECTION, SOLUTION SUBCUTANEOUS
Qty: 1 ML | Refills: 5 | Status: SHIPPED | OUTPATIENT
Start: 2025-05-13 | End: 2025-05-14

## 2025-05-13 NOTE — TELEPHONE ENCOUNTER
Would he be interested in starting Taltz injection once every 28 days. We touched on it in last office visit.

## 2025-05-13 NOTE — TELEPHONE ENCOUNTER
Pt called and stated that he feels like his current dose of celebrex seems to be working as long as he's active but stiffness is still bad. Please advise.

## 2025-05-13 NOTE — TELEPHONE ENCOUNTER
Spoke with patient to inform.  He states that at his last visit you discussed several options for injections and that you mentioned one that has less side effects than the others.  He would like to pursue the one with the least amount of side effects if possible.

## 2025-05-14 ENCOUNTER — TELEPHONE (OUTPATIENT)
Age: 55
End: 2025-05-14

## 2025-05-14 DIAGNOSIS — M45.7 ANKYLOSING SPONDYLITIS OF LUMBOSACRAL REGION (HCC): Primary | ICD-10-CM

## 2025-05-14 RX ORDER — SECUKINUMAB 150 MG/ML
150 INJECTION SUBCUTANEOUS WEEKLY
Qty: 5 ML | Refills: 0 | Status: ACTIVE | OUTPATIENT
Start: 2025-05-14

## 2025-05-14 RX ORDER — SECUKINUMAB 150 MG/ML
150 INJECTION SUBCUTANEOUS
Qty: 1 ML | Refills: 5 | Status: ACTIVE | OUTPATIENT
Start: 2025-05-14 | End: 2025-11-10

## 2025-05-14 NOTE — TELEPHONE ENCOUNTER
----- Message from NOLA Vann CNP sent at 5/14/2025  3:13 PM EDT -----  Regarding: FW: Taltz-Kobyial    ----- Message -----  From: Isak Souza  Sent: 5/14/2025   2:41 PM EDT  To: NOLA Germain CNP  Subject: RE: Taltz-Denial                                 It looks like its listed on 403 with a limit of 2/28 days  ----- Message -----  From: Nat Jimenez APRN - CNP  Sent: 5/14/2025   2:28 PM EDT  To: Isak Souza  Subject: RE: Taltz-Catalina Caballero had mentioned Cosentyx, is this covered? If not, we can try the Enbrel  ----- Message -----  From: Isak Souza  Sent: 5/14/2025   1:41 PM EDT  To: Siri Roberts; NOLA Alarcon CNP  Subject: RE: Taltz-Denbertram                                 Do you know what type of medication youd like to pursue? For instance I show on page 318 Enbrel is shown as a tier 3 PA required. We can try that if youd like?  ----- Message -----  From: Nat Jimenez APRN - CNP  Sent: 5/14/2025   1:26 PM EDT  To: Isak Souza  Subject: FW: Taltz-Denial                                 Isak,    Good afternoon, I started to review the formulary list, but it is significantly long. Do you happen to know some of the alternatives that this plan will cover?    Thanks  ----- Message -----  From: Olinda Garner MA  Sent: 5/14/2025  11:49 AM EDT  To: Juliet Caballero MD  Subject: FW: Taltz-Catalina                                   ----- Message -----  From: Siri Roberts  Sent: 5/14/2025  11:37 AM EDT  To: Isak Souza; [facility]  Subject: Taltz-Denial                                     Hello,   I submitted a PA for Mr. Doll and got a rejection for the Taltz because he hasn't tried two  formulary alternatives. I uploaded the formulary list to the patients media tab. Let me know how you want to proceed.   Thanks

## 2025-05-15 NOTE — TELEPHONE ENCOUNTER
Siri Roberts [facility] Clinical Staff  Hello,  I spoke with Mr. Doll who didn't seem to understand why he was no longer getting Taltz and is now getting Cosentyx.  He also seemed very skeptical of taking a different medication, I did suggest he speak with a pharmacist so he can ask questions.  He declined as he was busy but he may call back!   I wanted to make you aware as he may end up calling into your office as well.  Thanks    Joy Steiner RN True, [place]  Thank you. I reached out to the patient to explain to him the Taltz was denied and Cosentyx has been ordered in its place. Patient was understanding. Thank you.    Patient will contact the pharmacy for the Cosentyx.

## 2025-05-19 ENCOUNTER — TRANSCRIBE ORDERS (OUTPATIENT)
Dept: ADMINISTRATIVE | Age: 55
End: 2025-05-19

## 2025-05-19 DIAGNOSIS — M47.816 LUMBAR SPONDYLOSIS: Primary | ICD-10-CM

## 2025-05-19 DIAGNOSIS — M51.24 DISC DISPLACEMENT, THORACIC: ICD-10-CM

## 2025-06-11 ENCOUNTER — TELEPHONE (OUTPATIENT)
Age: 55
End: 2025-06-11

## 2025-06-11 DIAGNOSIS — R20.0 NUMBNESS IN BOTH HANDS: Primary | ICD-10-CM

## 2025-06-11 NOTE — TELEPHONE ENCOUNTER
Patient called stating he started the cosentyx and he feels the cosentyx is working for his arthritis. He feels the cosentyx is making his hands feel like they are asleep, especially at night, left hand is worse than the right. He feels his sleeping positions does not change the numbness feelings. He can sleep on his side or back and still have the numbness. He feels the numbness gets better throughout the day. He had problems with swelling in his right wrist in the past, but no numbness. He feels the numbness started after he started the cosentyx. Please advise. Thank you.

## 2025-06-11 NOTE — TELEPHONE ENCOUNTER
Please inform the patient this is not a typical side effect of Cosentyx. Usually numbness is a sign of nerve involvement or impingement. Dr Caballero would like him to have an EMG. Please find out if he would like to have this done at [facility], [facility], or Dr Sifuentes's office. You can send the response to Dr Caballero, thanks

## 2025-07-02 DIAGNOSIS — M45.7 ANKYLOSING SPONDYLITIS OF LUMBOSACRAL REGION (HCC): ICD-10-CM

## 2025-07-03 RX ORDER — CELECOXIB 200 MG/1
200 CAPSULE ORAL 2 TIMES DAILY
Qty: 60 CAPSULE | Refills: 5 | Status: SHIPPED | OUTPATIENT
Start: 2025-07-03

## 2025-07-08 ENCOUNTER — PROCEDURE VISIT (OUTPATIENT)
Dept: NEUROLOGY | Age: 55
End: 2025-07-08

## 2025-07-08 DIAGNOSIS — R20.0 BILATERAL HAND NUMBNESS: ICD-10-CM

## 2025-07-08 DIAGNOSIS — M54.12 CERVICAL RADICULOPATHY: ICD-10-CM

## 2025-07-08 DIAGNOSIS — G56.03 BILATERAL CARPAL TUNNEL SYNDROME: Primary | ICD-10-CM

## 2025-07-09 ENCOUNTER — TELEPHONE (OUTPATIENT)
Age: 55
End: 2025-07-09

## 2025-07-09 DIAGNOSIS — G56.03 BILATERAL CARPAL TUNNEL SYNDROME: Primary | ICD-10-CM

## 2025-07-09 NOTE — TELEPHONE ENCOUNTER
Patient called stating he completed EMG yesterday and was told he has early carpal tunnel. EMG results not yet available. Patient is willing to see [facility] and requesting referral. Please advise. Thank you.

## 2025-07-14 ENCOUNTER — TELEPHONE (OUTPATIENT)
Age: 55
End: 2025-07-14

## 2025-07-14 NOTE — TELEPHONE ENCOUNTER
Received fax from the patient pharmacy regarding cosentyx. He has advised me that he has spoken with the pharmacy.

## 2025-07-23 ASSESSMENT — ENCOUNTER SYMPTOMS
ABDOMINAL PAIN: 0
COUGH: 0
SHORTNESS OF BREATH: 0
VOMITING: 0
NAUSEA: 0

## 2025-07-23 NOTE — PROGRESS NOTES
[facility]   Rheumatology Clinic Note      7/24/2025          CHIEF COMPLAINT:    Chief Complaint   Patient presents with    3 Month Follow-Up     Ankylosing spondylitis of lumbosacral region (HCC)    Other     Right hand numbness  Pain level 5          HISTORY OF PRESENT ILLNESS:    54 y.o. male with recent diagnosis of radiograpic axial spondyloarthropathy presents for follow up. Presently, he is on Cosyntex 150 mg SQ every 28 days (started May 2025) and Celebrex 200 mg bid.  Past med: meloxicam    Reports significant improvement in his back pain and stiffness since starting Cosentyx. Has no symptoms in his back.   His main complaint is pain in the hands. Describes it as stinging pain. Had EMG/NCS recently that showed bilateral carpal tunnel syndrome.      Initial Consultation  12/19/20224:  Was diagnosed with PMR January 2023. Has been on slow decrease prednisone. Is presently on 5 mg daily.    Has low back pain for 25 plus years.  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    Episodes of upper back and bilateral shoulder blade pain associated with muscle spasms. Severe pain that he holds his breath for few seconds.  Episodes of muscle spasm in his mid-abdomen.    2 years ago, he noted more pain in upper back area. Nerve ablation did not help.   Progressively worsen. Pain started involving his shoulders and hips. Was having difficulty raising his arms due to pain and stiffness.     Recently, he has been having pain in his wrists. Driving causes numbness in the right hand. Episodes of swelling in the right wrist. Wearing a wrist brace helps.     No skin rash.  No psoriasis.  No IBD-like symptoms.      Past Medical History:     has a past medical history of Former smoker and Tonsil, abscess.    Past Surgical History:     has no past surgical history on file.    Current Medications:      Current Outpatient Medications:     celecoxib (CELEBREX) 200

## 2025-07-24 ENCOUNTER — OFFICE VISIT (OUTPATIENT)
Age: 55
End: 2025-07-24
Payer: COMMERCIAL

## 2025-07-24 VITALS
WEIGHT: 154.7 LBS | OXYGEN SATURATION: 98 % | HEIGHT: 70 IN | HEART RATE: 60 BPM | BODY MASS INDEX: 22.15 KG/M2 | DIASTOLIC BLOOD PRESSURE: 80 MMHG | SYSTOLIC BLOOD PRESSURE: 130 MMHG

## 2025-07-24 DIAGNOSIS — G56.03 BILATERAL CARPAL TUNNEL SYNDROME: ICD-10-CM

## 2025-07-24 DIAGNOSIS — M45.7 ANKYLOSING SPONDYLITIS OF LUMBOSACRAL REGION (HCC): Primary | ICD-10-CM

## 2025-07-24 PROCEDURE — 99214 OFFICE O/P EST MOD 30 MIN: CPT | Performed by: INTERNAL MEDICINE
